# Patient Record
Sex: FEMALE | Race: OTHER | HISPANIC OR LATINO | URBAN - METROPOLITAN AREA
[De-identification: names, ages, dates, MRNs, and addresses within clinical notes are randomized per-mention and may not be internally consistent; named-entity substitution may affect disease eponyms.]

---

## 2019-01-01 ENCOUNTER — EMERGENCY (EMERGENCY)
Facility: HOSPITAL | Age: 24
LOS: 1 days | Discharge: HOME | End: 2019-01-01
Attending: EMERGENCY MEDICINE | Admitting: EMERGENCY MEDICINE
Payer: COMMERCIAL

## 2019-01-01 VITALS
TEMPERATURE: 98 F | HEART RATE: 79 BPM | WEIGHT: 147.27 LBS | SYSTOLIC BLOOD PRESSURE: 109 MMHG | RESPIRATION RATE: 20 BRPM | OXYGEN SATURATION: 99 % | DIASTOLIC BLOOD PRESSURE: 69 MMHG

## 2019-01-01 VITALS
TEMPERATURE: 99 F | RESPIRATION RATE: 18 BRPM | DIASTOLIC BLOOD PRESSURE: 63 MMHG | OXYGEN SATURATION: 100 % | SYSTOLIC BLOOD PRESSURE: 104 MMHG | HEART RATE: 71 BPM

## 2019-01-01 DIAGNOSIS — Z79.899 OTHER LONG TERM (CURRENT) DRUG THERAPY: ICD-10-CM

## 2019-01-01 DIAGNOSIS — O03.9 COMPLETE OR UNSPECIFIED SPONTANEOUS ABORTION WITHOUT COMPLICATION: ICD-10-CM

## 2019-01-01 DIAGNOSIS — O20.9 HEMORRHAGE IN EARLY PREGNANCY, UNSPECIFIED: ICD-10-CM

## 2019-01-01 LAB
ANION GAP SERPL CALC-SCNC: 18 MMOL/L — HIGH (ref 5–17)
APPEARANCE UR: CLEAR — SIGNIFICANT CHANGE UP
APTT BLD: 29.1 SEC — SIGNIFICANT CHANGE UP (ref 27.5–36.3)
BASOPHILS NFR BLD AUTO: 0.1 % — SIGNIFICANT CHANGE UP (ref 0–2)
BILIRUB UR-MCNC: NEGATIVE — SIGNIFICANT CHANGE UP
BLD GP AB SCN SERPL QL: NEGATIVE — SIGNIFICANT CHANGE UP
BUN SERPL-MCNC: 11 MG/DL — SIGNIFICANT CHANGE UP (ref 7–23)
CALCIUM SERPL-MCNC: 8.9 MG/DL — SIGNIFICANT CHANGE UP (ref 8.4–10.5)
CHLORIDE SERPL-SCNC: 100 MMOL/L — SIGNIFICANT CHANGE UP (ref 96–108)
CO2 SERPL-SCNC: 21 MMOL/L — LOW (ref 22–31)
COLOR SPEC: YELLOW — SIGNIFICANT CHANGE UP
CREAT SERPL-MCNC: 0.53 MG/DL — SIGNIFICANT CHANGE UP (ref 0.5–1.3)
DIFF PNL FLD: NEGATIVE — SIGNIFICANT CHANGE UP
EOSINOPHIL NFR BLD AUTO: 1.3 % — SIGNIFICANT CHANGE UP (ref 0–6)
GLUCOSE SERPL-MCNC: 74 MG/DL — SIGNIFICANT CHANGE UP (ref 70–99)
GLUCOSE UR QL: NEGATIVE — SIGNIFICANT CHANGE UP
HCG SERPL-ACNC: HIGH MIU/ML
HCT VFR BLD CALC: 39.1 % — SIGNIFICANT CHANGE UP (ref 34.5–45)
HGB BLD-MCNC: 13.1 G/DL — SIGNIFICANT CHANGE UP (ref 11.5–15.5)
INR BLD: 1.18 — HIGH (ref 0.88–1.16)
KETONES UR-MCNC: NEGATIVE — SIGNIFICANT CHANGE UP
LEUKOCYTE ESTERASE UR-ACNC: NEGATIVE — SIGNIFICANT CHANGE UP
LYMPHOCYTES # BLD AUTO: 16.5 % — SIGNIFICANT CHANGE UP (ref 13–44)
MCHC RBC-ENTMCNC: 29.1 PG — SIGNIFICANT CHANGE UP (ref 27–34)
MCHC RBC-ENTMCNC: 33.5 G/DL — SIGNIFICANT CHANGE UP (ref 32–36)
MCV RBC AUTO: 86.9 FL — SIGNIFICANT CHANGE UP (ref 80–100)
MONOCYTES NFR BLD AUTO: 7.9 % — SIGNIFICANT CHANGE UP (ref 2–14)
NEUTROPHILS NFR BLD AUTO: 74.2 % — SIGNIFICANT CHANGE UP (ref 43–77)
NITRITE UR-MCNC: NEGATIVE — SIGNIFICANT CHANGE UP
PH UR: 7.5 — SIGNIFICANT CHANGE UP (ref 5–8)
PLATELET # BLD AUTO: 200 K/UL — SIGNIFICANT CHANGE UP (ref 150–400)
POTASSIUM SERPL-MCNC: 3.8 MMOL/L — SIGNIFICANT CHANGE UP (ref 3.5–5.3)
POTASSIUM SERPL-SCNC: 3.8 MMOL/L — SIGNIFICANT CHANGE UP (ref 3.5–5.3)
PROT UR-MCNC: NEGATIVE MG/DL — SIGNIFICANT CHANGE UP
PROTHROM AB SERPL-ACNC: 13.4 SEC — HIGH (ref 10–12.9)
RBC # BLD: 4.5 M/UL — SIGNIFICANT CHANGE UP (ref 3.8–5.2)
RBC # FLD: 13.5 % — SIGNIFICANT CHANGE UP (ref 10.3–16.9)
RH IG SCN BLD-IMP: POSITIVE — SIGNIFICANT CHANGE UP
SODIUM SERPL-SCNC: 139 MMOL/L — SIGNIFICANT CHANGE UP (ref 135–145)
SP GR SPEC: 1.02 — SIGNIFICANT CHANGE UP (ref 1–1.03)
UROBILINOGEN FLD QL: 0.2 E.U./DL — SIGNIFICANT CHANGE UP
WBC # BLD: 7 K/UL — SIGNIFICANT CHANGE UP (ref 3.8–10.5)
WBC # FLD AUTO: 7 K/UL — SIGNIFICANT CHANGE UP (ref 3.8–10.5)

## 2019-01-01 PROCEDURE — 76856 US EXAM PELVIC COMPLETE: CPT | Mod: 26

## 2019-01-01 PROCEDURE — 99284 EMERGENCY DEPT VISIT MOD MDM: CPT

## 2019-01-01 PROCEDURE — 86900 BLOOD TYPING SEROLOGIC ABO: CPT

## 2019-01-01 PROCEDURE — 76830 TRANSVAGINAL US NON-OB: CPT

## 2019-01-01 PROCEDURE — 81003 URINALYSIS AUTO W/O SCOPE: CPT

## 2019-01-01 PROCEDURE — 85025 COMPLETE CBC W/AUTO DIFF WBC: CPT

## 2019-01-01 PROCEDURE — 86901 BLOOD TYPING SEROLOGIC RH(D): CPT

## 2019-01-01 PROCEDURE — 85610 PROTHROMBIN TIME: CPT

## 2019-01-01 PROCEDURE — 84702 CHORIONIC GONADOTROPIN TEST: CPT

## 2019-01-01 PROCEDURE — 85730 THROMBOPLASTIN TIME PARTIAL: CPT

## 2019-01-01 PROCEDURE — 80048 BASIC METABOLIC PNL TOTAL CA: CPT

## 2019-01-01 PROCEDURE — 76856 US EXAM PELVIC COMPLETE: CPT

## 2019-01-01 PROCEDURE — 36415 COLL VENOUS BLD VENIPUNCTURE: CPT

## 2019-01-01 PROCEDURE — 76830 TRANSVAGINAL US NON-OB: CPT | Mod: 26

## 2019-01-01 PROCEDURE — 86850 RBC ANTIBODY SCREEN: CPT

## 2019-01-01 NOTE — ED PROVIDER NOTE - MEDICAL DECISION MAKING DETAILS
6 weeks pregnant by dates with light vag bleeding.  has not seen a doctor yet.  will get labs, urine, sono 6 weeks pregnant by dates with light vag bleeding.  has not seen a doctor yet.  will get labs, urine, sono.  sono shows gest sac, no fetal pole.  gyn saw patient want her to fu in office in one week

## 2019-01-01 NOTE — ED PROVIDER NOTE - NSFOLLOWUPCLINICS_GEN_ALL_ED_FT
[facility] [address] [place] [address]  Family Medicine  [address] [place] [address], [facility]  Phone: [phone]  Fax: [phone]  Follow Up Time: 7-10 Days

## 2019-01-01 NOTE — ED PROVIDER NOTE - OBJECTIVE STATEMENT
thinks she is about 6 weeks pregnant, took a test 3 days ago at home was positive.  co light bleeding for past week, a little heavier yesterday.  mild abd cramping

## 2019-01-01 NOTE — ED ADULT NURSE NOTE - OBJECTIVE STATEMENT
Pt reports LMP in November, has vaginal bleeding for 1 week, denies pain, n/v, dizziness, chest pain, SOB.

## 2019-01-02 NOTE — CONSULT NOTE ADULT - SUBJECTIVE AND OBJECTIVE BOX
22yo  at 6w1d by LMP  presenting for episode of vaginal bleeding today at 4am. Reports that at first she noted spotting on toilet paper but then had increased bleeding like a period. Had light cramping but has since resolved. Denies leakage of fluid. Denies passing clots or tissue. No fevers, chills, abdominal pain, chest pain, shortness of breath. She took a home pregnancy test which was positive a week ago, but has not seen a provider to initiate prenatal care or confirm IUP.      OBHx:   C/Sx1 arrest of dilation  VTOP x2 (1 with D&C)    GYNHx: denies abnormal pap smears; hx of chlamydia with previous pregnancy with adequate treatment   PMH: none  PSH: c/s x1  Meds: none  All: NKDA    Physical Exam  Vital Signs Last 24 Hrs  T(C): 37.2 (2019 23:24), Max: 37.2 (2019 23:24)  T(F): 99 (2019 23:24), Max: 99 (2019 23:24)  HR: 71 (2019 23:24) (71 - 79)  BP: 104/63 (2019 23:24) (104/63 - 109/69)  BP(mean): --  RR: 18 (2019 23:24) (18 - 20)  SpO2: 100% (2019 23:24) (99% - 100%)      Gen: resting comfortably in NAD  Abd: soft, nontender  Spec: cervix normal in appearance without vaginal bleeding                           13.1   7.0   )-----------( 200      ( 2019 18:02 )             39.1           139  |  100  |  11  ----------------------------<  74  3.8   |  21<L>  |  0.53    Ca    8.9      2019 18:02        Urinalysis (19 @ 18:02)    pH Urine: 7.5    Glucose Qualitative, Urine: NEGATIVE    Blood, Urine: NEGATIVE    Color: Yellow    Urine Appearance: Clear    Bilirubin: Negative    Ketone - Urine: NEGATIVE    Specific Gravity: 1.020    Protein, Urine: NEGATIVE mg/dL    Urobilinogen: 0.2 E.U./dL    Nitrite: NEGATIVE    Leukocyte Esterase Concentration: NEGATIVE    EXAM:  US PELVIC COMPLETE                          EXAM:  US TRANSVAGINAL                          PROCEDURE DATE:  2019          INTERPRETATION:  PELVIC ULTRASOUND dated 2019 7:13 PM    INDICATION: Bleeding and cramping. Positive pregnancy test one week ago.   Current urinary pregnancy test is negative. B-hCG 16,000. LMP: 2018.    TECHNIQUE: Transabdominal views of the pelvis were obtained followed by   transvaginal views for better visualization of the ovaries.      PRIOR STUDIES: None    FINDINGS:   These images demonstrate the uterus to be anteverted. The uterus is   normal in size, measuring 8 x 5 x 6 cm. No myometrial abnormalities are   seen. There is a probable gestational sac towards the fundus, measuring   1.0 x 0.4 x 1.3 with a mean diameter of 0.9 cm (5 weeks and 4 days). No   yolk sac or fetal pole is visualized. Complex avascular fluid is seen   within the inferior endometrium and cervix, likely reflecting blood   products. The cervix measures 2.4 cm in length.    The right ovary is normal in size, measuring 3.0 x 2.2 x 3.2 cm (volume   11 mL). The left ovary is normal in size, measuring 2.9 x 1.6 x 2.5 cm   (volume 6 mL). No ovarian masses are seen. Doppler evaluation   demonstrates arterial and venousflow to both ovaries.    No free fluid is identified.    IMPRESSION:   1.  Probable gestational sac located towards the fundus with an   ultrasound age of 5 weeks 4 days. No yolk sac or fetal pole is   visualized. Follow-up with serial beta hCGs and pelvic sonogram is   advised.  2.  Complex avascular fluid within the endometrial cavity and cervix   likely represents blood products.    HCG Quantitative, Serum (19 @ 18:02)    HCG Quantitative, Serum: 27276.0

## 2019-01-02 NOTE — CONSULT NOTE ADULT - ASSESSMENT
22yo  at 6w1d presenting with episode of vaginal bleeding, found to have empty gestational sac measuring 9mm. BHCG is 16,000 and with ultrasound findings is not diagnostic of pregnancy failure. Recommend repeat BHCG and sonogram in 1 week outpatient. No need for rhogam as blood type is A+. Anticipatory guidance discussed. Plan of care discussed with Dr. Jimenez.

## 2022-07-26 NOTE — ED ADULT NURSE NOTE - NS ED PATIENT SAFETY CONCERN
Attempted to reach patient by telephone. Left HIPAA compliant message requesting a return call. Will attempt to reach patient again. No